# Patient Record
Sex: FEMALE | Race: WHITE | URBAN - METROPOLITAN AREA
[De-identification: names, ages, dates, MRNs, and addresses within clinical notes are randomized per-mention and may not be internally consistent; named-entity substitution may affect disease eponyms.]

---

## 2022-02-22 ENCOUNTER — NEW PATIENT (OUTPATIENT)
Dept: URBAN - METROPOLITAN AREA CLINIC 21 | Facility: CLINIC | Age: 32
End: 2022-02-22

## 2022-02-22 DIAGNOSIS — D49.89: ICD-10-CM

## 2022-02-22 PROCEDURE — 92002 INTRM OPH EXAM NEW PATIENT: CPT

## 2022-02-22 ASSESSMENT — TONOMETRY
OS_IOP_MMHG: 16
OD_IOP_MMHG: 13

## 2022-02-22 ASSESSMENT — VISUAL ACUITY
OS_SC: 20/25-2
OD_SC: 20/20

## 2022-02-22 NOTE — PATIENT DISCUSSION
Present and unchanging for >2 months, no diplopia or changes in vision, no pain, fevers, redness or discharge.

## 2022-02-22 NOTE — PATIENT DISCUSSION
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

## 2022-02-22 NOTE — PATIENT DISCUSSION
PRESUMED TO HAVE BEEN LACRIMAL INFLAMMATION AND TREATED UNSUCCESSFULLY WITH TOPICAL STEROIDS FOR PAST 3 WEEKS.

## 2022-02-22 NOTE — PATIENT DISCUSSION
reassuring exam other than lid fullness of the LILI, however patient is young female with known autoimmune deficiency and given fullness recommend further evaluation with likely MRI imaging and evaluation with Ocular Oncology at [facility].

## 2022-03-01 ENCOUNTER — TECH ONLY (OUTPATIENT)
Dept: URBAN - METROPOLITAN AREA CLINIC 21 | Facility: CLINIC | Age: 32
End: 2022-03-01

## 2022-03-01 DIAGNOSIS — D49.89: ICD-10-CM

## 2022-03-01 PROCEDURE — 99211T TECH SERVICE

## 2022-03-01 PROCEDURE — 92083 EXTENDED VISUAL FIELD XM: CPT

## 2022-03-01 NOTE — PATIENT DISCUSSION
reassuring exam other than lid fullness of the LILI, however patient is young female with known autoimmune deficiency and given fullness recommend further evaluation with likely MRI imaging and evaluation with Ocular Oncology at [facility].

## 2022-04-12 ENCOUNTER — FOLLOW UP (OUTPATIENT)
Dept: URBAN - METROPOLITAN AREA CLINIC 21 | Facility: CLINIC | Age: 32
End: 2022-04-12

## 2022-04-12 DIAGNOSIS — D49.89: ICD-10-CM

## 2022-04-12 PROCEDURE — 99213 OFFICE O/P EST LOW 20 MIN: CPT

## 2022-04-12 ASSESSMENT — VISUAL ACUITY
OD_SC: 20/20
OS_SC: 20/25+1

## 2022-04-12 ASSESSMENT — TONOMETRY
OS_IOP_MMHG: 14
OD_IOP_MMHG: 14

## 2022-04-12 NOTE — PATIENT DISCUSSION
reassuring exam other than lid fullness of the LILI, however patient is young female with known autoimmune deficiency and given fullness recommend further evaluation with likely MRI imaging and evaluation with Ocular Oncology at [facility].

## 2022-04-12 NOTE — PATIENT DISCUSSION
04/12/2022-Improving with Steroid while on Medrol dose pack. Swelling returned once stopped. No Diplopia or headaches.